# Patient Record
Sex: FEMALE | Race: BLACK OR AFRICAN AMERICAN
[De-identification: names, ages, dates, MRNs, and addresses within clinical notes are randomized per-mention and may not be internally consistent; named-entity substitution may affect disease eponyms.]

---

## 2019-11-08 ENCOUNTER — HOSPITAL ENCOUNTER (OUTPATIENT)
Dept: HOSPITAL 61 - US | Age: 39
Discharge: HOME | End: 2019-11-08
Attending: SURGERY
Payer: COMMERCIAL

## 2019-11-08 DIAGNOSIS — E04.1: Primary | ICD-10-CM

## 2019-11-08 PROCEDURE — 76942 ECHO GUIDE FOR BIOPSY: CPT

## 2019-11-08 PROCEDURE — C1713 ANCHOR/SCREW BN/BN,TIS/BN: HCPCS

## 2019-11-08 PROCEDURE — 10005 FNA BX W/US GDN 1ST LES: CPT

## 2019-11-08 PROCEDURE — 88305 TISSUE EXAM BY PATHOLOGIST: CPT

## 2019-11-08 PROCEDURE — 88173 CYTOPATH EVAL FNA REPORT: CPT

## 2019-11-08 PROCEDURE — 19081 BX BREAST 1ST LESION STRTCTC: CPT

## 2019-11-08 NOTE — RAD
Ultrasound-guided fine-needle aspiration of the thyroid gland

 

History: Bilateral thyroid nodules. Prominent 3.2 cm nodule of the lower 

pole of the right lobe seen on outside thyroid imaging study performed at 

diagnostic imaging centers.

 

Procedure: The patient provided both verbal and written consent after the 

procedure and possible complications including bleeding and infection were

explained. A timeout was performed which confirmed the name of the patient

and the date of birth and the type of procedure and the side of the 

procedure. Allergies to medications were reviewed. Sonography of the 

thyroid gland was performed and an appropriate skin keegan was made 

overlying a 3.3 cm solid nodule of the lower pole of the right lobe. The 

anterior aspect of the right side of the neck was prepped and draped in 

the usual sterile fashion with ChloraPrep. A total of 4 cc of 1% lidocaine

was utilized for local anesthesia. Using sterile technique and ultrasound 

guidance, 4 separate 25-gauge fine-needle aspirations of the 3.3 cm solid 

nodule of the lower pole solid nodule of the right lobe of the thyroid 

gland were performed. Sonographic spot images were performed. These 

aspirations were processed on slides by a cytologist.  The specimens were 

sent to laboratory for further processing and evaluation. Hemostasis was 

deemed adequate after 3 minutes of manual pressure. Post procedure 

sonography demonstrated no significant hematoma. The patient tolerated the

procedure well without complication. Sterile Band-Aid was applied to right

side of the neck. The patient was given instructions to return to the 

emergency room if there is significant swelling or bleeding in the neck. 

The patient was instructed to take Tylenol for any discomfort and to apply

ice to the right side of the neck for any swelling.

 

Impression: Ultrasound-guided fine-needle aspiration biopsy of the 3.3 cm 

solid nodule of the lower pole of the right lobe of the thyroid gland was 

performed without complication. Follow-up will be with the patient's 

physician.

 

Electronically signed by: Raphael Brown MD (11/8/2019 9:33 AM) [facility]

## 2019-11-14 NOTE — PATHOLOGY
Note

[facility] Accession Number: [id number]

   TESTS               RESULT  FLAG  UNITS    REF RANGE  LAB

------------------------------------------------------------

   Clinician Provided Cytology Information

   No. of containers..01 Other (Miscellaneous)

Source:                                                   01

   RT THYROID NODULE

DIAGNOSIS:                                                02

   RT THYROID NODULE

   NEGATIVE FOR MALIGNANT CELLS.

   BETHESDA CATEGORY II. SPECIMEN CONSISTS OF THYROID FOLLICULAR CELLS

   IN A MACROFOLLICULAR ARRANGEMENT, FEW HURTHLE CELLS, AND COLLOID.

   FAVOR BENIGN ADENOMATOID NODULE. THE CASE IS ALSO EXAMINED BY DR. PRAVEEN CHAVEZ, WHO CONCURS WITH THE DIGNOSIS.

   THIS INTERPRETATION INCLUDES EVALUATION OF A CELL BLOCK.

Pathologist ICD10:                                        02

   E04.1

Signed out by:                                            02

   Faheem Keys MD, Pathologist

   NPI- [id number]

Performed by:                                             01

   Beatrice Goetz, Cytotechnologist ([facility])

Gross description:                                        01

   30 ML, CHEPE, CLOUDY

   /LCS  12/31/1840  0000 Local



------------------------------------------------------------

    FLAG LEGEND:

    L-Low Normal,H-High Normal,LL-Alert Low,HH-Alert High

    <-Panic Low,>-Panic High,A-Abnormal,AA-Critical Abnormal

------------------------------------------------------------



Performed at:

[address] [facility]

   [address]

   [place]  [address]

   Linus Soriano MD, Phone: [phone]

[address] [facility]

   [address] [place]  [address]

   Faheem Keys MD, Phone: [phone]

Specimen Comment: A courtesy copy of this report has been sent to [phone], [phone]

Specimen Comment: [id number], [phone]

Specimen Comment: Report sent to ,DR RICHTER / DR DIAZ

***Performed at:  01

   [address], [place], [place]  [id number]

   MD Linus Soriano MD Phone:  [phone]

## 2020-07-21 ENCOUNTER — HOSPITAL ENCOUNTER (OUTPATIENT)
Dept: HOSPITAL 61 - US | Age: 40
Discharge: HOME | End: 2020-07-21
Attending: SURGERY
Payer: COMMERCIAL

## 2020-07-21 DIAGNOSIS — E04.1: Primary | ICD-10-CM

## 2020-07-21 PROCEDURE — 88173 CYTOPATH EVAL FNA REPORT: CPT

## 2020-07-21 PROCEDURE — 60300 ASPIR/INJ THYROID CYST: CPT

## 2020-07-21 PROCEDURE — 76942 ECHO GUIDE FOR BIOPSY: CPT

## 2020-07-21 PROCEDURE — 10005 FNA BX W/US GDN 1ST LES: CPT

## 2020-07-21 PROCEDURE — 88305 TISSUE EXAM BY PATHOLOGIST: CPT

## 2020-07-21 NOTE — RAD
EXAM: ULTRASOUND-GUIDED THYROID FINE-NEEDLE ASPIRATION.

 

HISTORY: Left thyroid nodule. Ultrasound-guided biopsy is requested.

 

FINDINGS: The procedure along with its risks and benefits were explained 

to the patient. She agreed to proceed. A timeout procedure was performed.

 

Sonographic images of the thyroid gland were obtained. The solid target 

nodule in the left lobe anteriorly was adequately visualized for biopsy.

 

The overlying skin was sterilely prepped and infiltrated with 1% lidocaine

for local anesthesia. Under ultrasound guidance, 4 aspirates were obtained

using 25-gauge needles. These were hand delivered to pathology who 

determined them adequate for diagnosis. A sterile dressing was placed. 

There were no immediate complications.

 

IMPRESSION:

1. Successful ultrasound-guided fine-needle aspiration of the left thyroid

nodule.

 

Electronically signed by: REGINO Willingham MD (7/21/2020 1:54 PM) 

[id number]

## 2021-10-28 ENCOUNTER — HOSPITAL ENCOUNTER (OUTPATIENT)
Dept: HOSPITAL 61 - KCIC US | Age: 41
End: 2021-10-28
Attending: NURSE PRACTITIONER
Payer: COMMERCIAL

## 2021-10-28 DIAGNOSIS — E04.2: Primary | ICD-10-CM

## 2021-10-28 PROCEDURE — 76536 US EXAM OF HEAD AND NECK: CPT

## 2021-10-28 NOTE — KCIC
EXAM: Thyroid sonogram.



HISTORY: Thyroid nodules.



TECHNIQUE: Sonographic imaging of the thyroid was performed.



COMPARISON: 7/21/2020 and 11/8/2019.



FINDINGS: The right thyroid lobe measures 6.6 x 3.0 x 2.2 cm. The left thyroid lobe measures 5.7 x 1.
9 x 1.8 cm. The thyroid isthmus measures 3.5 mm. The thyroid parenchyma is diffusely heterogeneous. T
here are multiple thyroid nodules.



For reference purposes, the largest thyroid nodule is seen within the inferior right thyroid lobe. Th
is is solid and heterogeneous and hypoechoic with a wider than tall morphology and smooth margins, me
asuring 3.5 x 3.4 x 2.3 cm.



The second largest nodule is seen within the superior left thyroid lobe. This is solid and heterogene
ous and hypoechoic with a wider than tall morphology and indistinct margins, measuring 1.5 x 1.1 x 1.
1 cm.



The third largest nodule is seen within the mid right thyroid lobe. This is solid and heterogeneous a
nd hypoechoic with wider than tall morphology and indistinct margins, measuring 10 x 9 x 8 mm. The ad
ditional nodules and cysts are subcentimeter in size.



IMPRESSION:

1. Dominant nodule within the inferior right thyroid lobe measuring 3.5 cm. TI-RADS Category 4. This 
is slightly increased compared to the prior studies. Correlate with pathology findings corresponding 
with fine-needle aspiration of this lesion performed 11/8/2019.

2. 1.5 cm nodule within the superior left thyroid lobe. TI-RADS Category 4. This is not appreciably c
hanged compared to a sonogram performed during fine-needle aspiration on 7/21/2020. Correlate with pa
thology findings.

3. Multiple additional thyroid nodules, the largest of which measuring 10 mm within the mid right thy
roid lobe is slightly increased compared to the prior exam.TI-RADS Category 4: Sonographic follow-up 
is recommended for category 4 nodules of this size.

4. Heterogeneous enlarged thyroid.



Electronically signed by: Dolores Cole MD (10/28/2021 11:45 AM) [id number]